# Patient Record
Sex: FEMALE | Race: BLACK OR AFRICAN AMERICAN | NOT HISPANIC OR LATINO | URBAN - METROPOLITAN AREA
[De-identification: names, ages, dates, MRNs, and addresses within clinical notes are randomized per-mention and may not be internally consistent; named-entity substitution may affect disease eponyms.]

---
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
## 2022-04-12 ENCOUNTER — APPOINTMENT (OUTPATIENT)
Dept: OBGYN | Facility: CLINIC | Age: 22
End: 2022-04-12
Payer: MEDICAID

## 2022-04-12 ENCOUNTER — NON-APPOINTMENT (OUTPATIENT)
Age: 22
End: 2022-04-12

## 2022-04-12 PROCEDURE — 99214 OFFICE O/P EST MOD 30 MIN: CPT

## 2022-04-12 PROCEDURE — 76801 OB US < 14 WKS SINGLE FETUS: CPT

## 2022-04-12 PROCEDURE — 87490 CHLMYD TRACH DNA DIR PROBE: CPT

## 2022-04-14 ENCOUNTER — LABORATORY RESULT (OUTPATIENT)
Age: 22
End: 2022-04-14

## 2022-04-22 ENCOUNTER — OUTPATIENT (OUTPATIENT)
Dept: OUTPATIENT SERVICES | Facility: HOSPITAL | Age: 22
LOS: 1 days | Discharge: HOME | End: 2022-04-22

## 2022-04-22 ENCOUNTER — ASOB RESULT (OUTPATIENT)
Age: 22
End: 2022-04-22

## 2022-04-22 ENCOUNTER — APPOINTMENT (OUTPATIENT)
Dept: ANTEPARTUM | Facility: CLINIC | Age: 22
End: 2022-04-22
Payer: MEDICAID

## 2022-04-22 PROCEDURE — 76813 OB US NUCHAL MEAS 1 GEST: CPT | Mod: 26

## 2022-05-05 ENCOUNTER — APPOINTMENT (OUTPATIENT)
Dept: OBGYN | Facility: CLINIC | Age: 22
End: 2022-05-05
Payer: MEDICAID

## 2022-05-05 PROCEDURE — 99213 OFFICE O/P EST LOW 20 MIN: CPT

## 2022-05-20 ENCOUNTER — OUTPATIENT (OUTPATIENT)
Dept: OUTPATIENT SERVICES | Facility: HOSPITAL | Age: 22
LOS: 1 days | Discharge: HOME | End: 2022-05-20

## 2022-06-07 ENCOUNTER — APPOINTMENT (OUTPATIENT)
Dept: ANTEPARTUM | Facility: CLINIC | Age: 22
End: 2022-06-07
Payer: MEDICAID

## 2022-06-07 ENCOUNTER — OUTPATIENT (OUTPATIENT)
Dept: OUTPATIENT SERVICES | Facility: HOSPITAL | Age: 22
LOS: 1 days | Discharge: HOME | End: 2022-06-07

## 2022-06-07 ENCOUNTER — ASOB RESULT (OUTPATIENT)
Age: 22
End: 2022-06-07

## 2022-06-07 PROCEDURE — 76817 TRANSVAGINAL US OBSTETRIC: CPT | Mod: 26

## 2022-06-07 PROCEDURE — 76805 OB US >/= 14 WKS SNGL FETUS: CPT | Mod: 26

## 2022-06-14 ENCOUNTER — APPOINTMENT (OUTPATIENT)
Dept: OBGYN | Facility: CLINIC | Age: 22
End: 2022-06-14

## 2022-06-21 ENCOUNTER — APPOINTMENT (OUTPATIENT)
Dept: OBGYN | Facility: CLINIC | Age: 22
End: 2022-06-21
Payer: MEDICAID

## 2022-06-21 PROCEDURE — 99213 OFFICE O/P EST LOW 20 MIN: CPT

## 2022-06-24 ENCOUNTER — APPOINTMENT (OUTPATIENT)
Dept: ANTEPARTUM | Facility: CLINIC | Age: 22
End: 2022-06-24

## 2022-07-25 ENCOUNTER — APPOINTMENT (OUTPATIENT)
Dept: OBGYN | Facility: CLINIC | Age: 22
End: 2022-07-25

## 2022-07-25 PROCEDURE — 99213 OFFICE O/P EST LOW 20 MIN: CPT

## 2022-08-04 ENCOUNTER — OUTPATIENT (OUTPATIENT)
Dept: OUTPATIENT SERVICES | Facility: HOSPITAL | Age: 22
LOS: 1 days | Discharge: HOME | End: 2022-08-04

## 2022-08-08 ENCOUNTER — OUTPATIENT (OUTPATIENT)
Dept: OUTPATIENT SERVICES | Facility: HOSPITAL | Age: 22
LOS: 1 days | Discharge: HOME | End: 2022-08-08

## 2022-08-17 ENCOUNTER — OUTPATIENT (OUTPATIENT)
Dept: OUTPATIENT SERVICES | Facility: HOSPITAL | Age: 22
LOS: 1 days | Discharge: HOME | End: 2022-08-17

## 2022-08-18 ENCOUNTER — APPOINTMENT (OUTPATIENT)
Dept: OBGYN | Facility: CLINIC | Age: 22
End: 2022-08-18

## 2022-08-18 PROCEDURE — 99213 OFFICE O/P EST LOW 20 MIN: CPT

## 2022-08-19 DIAGNOSIS — K02.63 DENTAL CARIES ON SMOOTH SURFACE PENETRATING INTO PULP: ICD-10-CM

## 2022-08-23 ENCOUNTER — OUTPATIENT (OUTPATIENT)
Dept: OUTPATIENT SERVICES | Facility: HOSPITAL | Age: 22
LOS: 1 days | Discharge: HOME | End: 2022-08-23

## 2022-09-02 ENCOUNTER — OUTPATIENT (OUTPATIENT)
Dept: OUTPATIENT SERVICES | Facility: HOSPITAL | Age: 22
LOS: 1 days | Discharge: HOME | End: 2022-09-02

## 2022-09-08 ENCOUNTER — APPOINTMENT (OUTPATIENT)
Dept: OBGYN | Facility: CLINIC | Age: 22
End: 2022-09-08

## 2022-09-08 VITALS
DIASTOLIC BLOOD PRESSURE: 70 MMHG | SYSTOLIC BLOOD PRESSURE: 120 MMHG | BODY MASS INDEX: 27.31 KG/M2 | WEIGHT: 160 LBS | HEIGHT: 64 IN

## 2022-09-08 PROCEDURE — 99213 OFFICE O/P EST LOW 20 MIN: CPT

## 2022-09-09 ENCOUNTER — OUTPATIENT (OUTPATIENT)
Dept: OUTPATIENT SERVICES | Facility: HOSPITAL | Age: 22
LOS: 1 days | Discharge: HOME | End: 2022-09-09

## 2022-09-09 ENCOUNTER — ASOB RESULT (OUTPATIENT)
Age: 22
End: 2022-09-09

## 2022-09-09 ENCOUNTER — APPOINTMENT (OUTPATIENT)
Dept: ANTEPARTUM | Facility: CLINIC | Age: 22
End: 2022-09-09

## 2022-09-09 PROCEDURE — 76816 OB US FOLLOW-UP PER FETUS: CPT | Mod: 26

## 2022-09-16 ENCOUNTER — OUTPATIENT (OUTPATIENT)
Dept: OUTPATIENT SERVICES | Facility: HOSPITAL | Age: 22
LOS: 1 days | Discharge: HOME | End: 2022-09-16

## 2022-09-22 ENCOUNTER — APPOINTMENT (OUTPATIENT)
Dept: OBGYN | Facility: CLINIC | Age: 22
End: 2022-09-22

## 2022-09-22 LAB
ABO + RH PNL BLD: NORMAL
BASOPHILS # BLD AUTO: 0.02 K/UL
BASOPHILS NFR BLD AUTO: 0.3 %
EOSINOPHIL # BLD AUTO: 0.06 K/UL
EOSINOPHIL NFR BLD AUTO: 0.9 %
GLUCOSE 1H P 50 G GLC PO SERPL-MCNC: 84 MG/DL
HBV SURFACE AG SER QL: NONREACTIVE
HCT VFR BLD CALC: 36.7 %
HCV AB SER QL: NONREACTIVE
HCV S/CO RATIO: 0.13 S/CO
HGB BLD-MCNC: 11.6 G/DL
HIV1+2 AB SPEC QL IA.RAPID: NONREACTIVE
IMM GRANULOCYTES NFR BLD AUTO: 0.3 %
LYMPHOCYTES # BLD AUTO: 1.45 K/UL
LYMPHOCYTES NFR BLD AUTO: 21.6 %
MAN DIFF?: NORMAL
MCHC RBC-ENTMCNC: 28.8 PG
MCHC RBC-ENTMCNC: 31.6 G/DL
MCV RBC AUTO: 91.1 FL
MEV IGG FLD QL IA: 20.1 AU/ML
MEV IGG+IGM SER-IMP: POSITIVE
MONOCYTES # BLD AUTO: 0.44 K/UL
MONOCYTES NFR BLD AUTO: 6.6 %
NEUTROPHILS # BLD AUTO: 4.71 K/UL
NEUTROPHILS NFR BLD AUTO: 70.3 %
PLATELET # BLD AUTO: 224 K/UL
RBC # BLD: 4.03 M/UL
RBC # FLD: 15.4 %
RUBV IGG FLD-ACNC: 1.9 INDEX
RUBV IGG SER-IMP: POSITIVE
T PALLIDUM AB SER QL IA: ABNORMAL
WBC # FLD AUTO: 6.7 K/UL

## 2022-10-03 ENCOUNTER — APPOINTMENT (OUTPATIENT)
Dept: OBGYN | Facility: CLINIC | Age: 22
End: 2022-10-03

## 2022-10-03 VITALS
WEIGHT: 170 LBS | HEIGHT: 64 IN | SYSTOLIC BLOOD PRESSURE: 130 MMHG | DIASTOLIC BLOOD PRESSURE: 75 MMHG | BODY MASS INDEX: 29.02 KG/M2

## 2022-10-03 LAB
BILIRUB UR QL STRIP: NORMAL
CLARITY UR: CLEAR
COLLECTION METHOD: NORMAL
GLUCOSE UR-MCNC: NORMAL
HCG UR QL: 0.2 EU/DL
HGB UR QL STRIP.AUTO: NORMAL
KETONES UR-MCNC: NORMAL
LEUKOCYTE ESTERASE UR QL STRIP: NORMAL
NITRITE UR QL STRIP: NORMAL
PH UR STRIP: 7
PROT UR STRIP-MCNC: NORMAL
SP GR UR STRIP: 1.02

## 2022-10-03 PROCEDURE — 99213 OFFICE O/P EST LOW 20 MIN: CPT

## 2022-10-03 NOTE — HISTORY OF PRESENT ILLNESS
[FreeTextEntry1] : 23 y/o  for prenatal visit\par no complaints\par +fm, no rom, no bleeding, no ctx\par \par Per pt-uncomplicated pregnancy\par all labs nl, sono nl per pt\par \par today -pt doing well\par last sono aga

## 2022-10-05 ENCOUNTER — OUTPATIENT (OUTPATIENT)
Dept: OUTPATIENT SERVICES | Facility: HOSPITAL | Age: 22
LOS: 1 days | Discharge: HOME | End: 2022-10-05

## 2022-10-05 VITALS
SYSTOLIC BLOOD PRESSURE: 131 MMHG | TEMPERATURE: 99 F | RESPIRATION RATE: 14 BRPM | HEART RATE: 88 BPM | DIASTOLIC BLOOD PRESSURE: 78 MMHG

## 2022-10-05 VITALS — SYSTOLIC BLOOD PRESSURE: 131 MMHG | DIASTOLIC BLOOD PRESSURE: 78 MMHG | HEART RATE: 88 BPM

## 2022-10-05 DIAGNOSIS — O47.1 FALSE LABOR AT OR AFTER 37 COMPLETED WEEKS OF GESTATION: ICD-10-CM

## 2022-10-05 DIAGNOSIS — O26.893 OTHER SPECIFIED PREGNANCY RELATED CONDITIONS, THIRD TRIMESTER: ICD-10-CM

## 2022-10-05 LAB
GP B STREP DNA SPEC QL NAA+PROBE: NORMAL
GP B STREP DNA SPEC QL NAA+PROBE: NOT DETECTED
SOURCE GBS: NORMAL

## 2022-10-05 PROCEDURE — 59025 FETAL NON-STRESS TEST: CPT | Mod: 26

## 2022-10-05 PROCEDURE — 99215 OFFICE O/P EST HI 40 MIN: CPT | Mod: 25

## 2022-10-05 PROCEDURE — 76815 OB US LIMITED FETUS(S): CPT | Mod: 26

## 2022-10-05 PROCEDURE — 99417 PROLNG OP E/M EACH 15 MIN: CPT | Mod: 25

## 2022-10-05 NOTE — OB PROVIDER TRIAGE NOTE - NSHPPHYSICALEXAM_GEN_ALL_CORE
Vital Signs Last 24 Hrs  T(C): 37.3 (05 Oct 2022 00:38), Max: 37.3 (05 Oct 2022 00:38)  T(F): 99.1 (05 Oct 2022 00:38), Max: 99.1 (05 Oct 2022 00:38)  HR: 88 (05 Oct 2022 00:38) (88 - 88)  BP: 131/78 (05 Oct 2022 00:38) (131/78 - 131/78)  BP(mean): --  RR: 14 (05 Oct 2022 00:38) (14 - 14)    Gen: NAD, sitting comfortably  Abd: Gravid, soft, NT, palpable ctx  SVE: FT/0/-3, vtx, intact  EFM: 140/mod/+accels/intermitent variable decels - cat 2  [place]: none  Sono: Vital Signs Last 24 Hrs  T(C): 37.3 (05 Oct 2022 00:38), Max: 37.3 (05 Oct 2022 00:38)  T(F): 99.1 (05 Oct 2022 00:38), Max: 99.1 (05 Oct 2022 00:38)  HR: 88 (05 Oct 2022 00:38) (88 - 88)  BP: 131/78 (05 Oct 2022 00:38) (131/78 - 131/78)  BP(mean): --  RR: 14 (05 Oct 2022 00:38) (14 - 14)    Gen: NAD, sitting comfortably  Abd: Gravid, soft, NT, palpable ctx  SVE: FT/0/-3, vtx, intact  EFM: reactive  [place]: none

## 2022-10-05 NOTE — OB RN TRIAGE NOTE - FALL HARM RISK - UNIVERSAL INTERVENTIONS
Bed in lowest position, wheels locked, appropriate side rails in place/Call bell, personal items and telephone in reach/Instruct patient to call for assistance before getting out of bed or chair/Non-slip footwear when patient is out of bed/[place] to call system/Physically safe environment - no spills, clutter or unnecessary equipment/Purposeful Proactive Rounding/Room/bathroom lighting operational, light cord in reach

## 2022-10-05 NOTE — OB PROVIDER TRIAGE NOTE - ATTENDING COMMENTS
37 weeks, not in labor, SROM ruled out  EFM reactive  VS WNL  sono WNL  precautions given  The patient presented to triage and was evaluated starting at 0038.  She was discharged at 0138. I spent 60minutes caring for the patient.  This included obtaining a history, performing a physical examination, continuously monitoring and interpreting the fetal heart rate and tocometer patterns, ordering and reviewing labs, documenting in the medical record, and periodic reassessment and discussion with the patient.

## 2022-10-05 NOTE — OB PROVIDER TRIAGE NOTE - NSHPLABSRESULTS_GEN_ALL_CORE
Labs  4/14  HBsAG NR  Hep C NR  RPR NR  Rubella immune  measles immune  Trep pallidum ab equivical  FTA syphilis conformation NR  O Pos  HIV NR    7/25 GCT 84    Sono  34.0, EFW 2491g (65%), MVP 7.84, posterior placenta, vtx.   20.4, normal anatomy but limited views, MVP 4.8

## 2022-10-05 NOTE — OB PROVIDER TRIAGE NOTE - HISTORY OF PRESENT ILLNESS
1. Take Bactrim twice a day for 7 days with food.  2. Take Diflucan today, repeat in 72 hours of still having symptoms  3. Will call with urine culture results in a few days  4. Follow-up for new or worsening symptoms.   23 yo  @37w5d, NANCY 10/21 by first trimester sonogram, presents for concern she broke her water. Pt reporting urinating at 11pm and the urine was yellow. She then urinated a second time and it was clear, and she became concerned she  her water. She reports good fetal movement, denies any vaginal bleeding or contractions. Denies any pregnancy complications GBS unknown (was swabbed at prenatal visit on 10/3).     Last intercourse - days ago  last meal - 8pm  last bowel movement - unknown

## 2022-10-05 NOTE — OB PROVIDER TRIAGE NOTE - NSOBPROVIDERNOTE_OBGYN_ALL_OB_FT
A/P: 33 yo  @37w5d, GBS unknown, not ruptured, category 2 tracing, resolved, reassuring fetal and maternal status    -membranes not ruptured  -category 2 tracing, resolved to category 1 after pt given juice to drink  -PO hydration encouraged  -labor precautions discussed  -pt instructed to f/u with PMD for scheduled visit  -discharged in stable condition A/P: 31 yo  @37w5d, GBS unknown, not ruptured, category 2 tracing, resolved, BPP 8/8, reassuring fetal and maternal status    -membranes not ruptured  -category 2 tracing, resolved to category 1 after pt given juice to drink  -PO hydration encouraged  -labor precautions discussed  -pt instructed to f/u with PMD for scheduled visit  -discharged in stable condition    Dr. Estevez and Dr. Contreras aware A/P: 33 yo  @37w5d, GBS unknown, not ruptured, BPP 8/8, reassuring fetal and maternal status    -membranes not ruptured  -PO hydration encouraged  -labor precautions discussed  -pt instructed to f/u with PMD for scheduled visit  -discharged in stable condition

## 2022-10-10 PROBLEM — Z78.9 OTHER SPECIFIED HEALTH STATUS: Chronic | Status: ACTIVE | Noted: 2022-10-05

## 2022-10-12 ENCOUNTER — APPOINTMENT (OUTPATIENT)
Dept: OBGYN | Facility: CLINIC | Age: 22
End: 2022-10-12

## 2022-10-12 LAB
A VAGINAE DNA VAG QL NAA+PROBE: NORMAL
BVAB2 DNA VAG QL NAA+PROBE: NORMAL
C KRUSEI DNA VAG QL NAA+PROBE: NEGATIVE
C KRUSEI DNA VAG QL NAA+PROBE: POSITIVE
C TRACH RRNA SPEC QL NAA+PROBE: NEGATIVE
MEGA1 DNA VAG QL NAA+PROBE: NORMAL
N GONORRHOEA RRNA SPEC QL NAA+PROBE: NEGATIVE
T VAGINALIS RRNA SPEC QL NAA+PROBE: NEGATIVE

## 2022-10-16 ENCOUNTER — OUTPATIENT (OUTPATIENT)
Dept: OUTPATIENT SERVICES | Facility: HOSPITAL | Age: 22
LOS: 1 days | Discharge: HOME | End: 2022-10-16

## 2022-10-16 VITALS
RESPIRATION RATE: 17 BRPM | HEART RATE: 72 BPM | TEMPERATURE: 99 F | SYSTOLIC BLOOD PRESSURE: 137 MMHG | DIASTOLIC BLOOD PRESSURE: 85 MMHG

## 2022-10-16 VITALS — HEART RATE: 81 BPM | DIASTOLIC BLOOD PRESSURE: 75 MMHG | SYSTOLIC BLOOD PRESSURE: 123 MMHG

## 2022-10-16 NOTE — OB PROVIDER TRIAGE NOTE - HISTORY OF PRESENT ILLNESS
21 yo  @39w2d, NANCY 10/21 by first trimester sonogram, presents after "passing her mucus plug". Reports mild irregular contractions, 3/10, every 5-10 minutes. Denies LOF, VB. Good FM. Denies complications this pregnancy. GBS negative.

## 2022-10-16 NOTE — OB PROVIDER TRIAGE NOTE - NSHPPHYSICALEXAM_GEN_ALL_CORE
T(C): 37.1 (10-16-22 @ 21:26), Max: 37.1 (10-16-22 @ 21:26)  HR: 76 (10-16-22 @ 21:56) (72 - 76)  BP: 136/75 (10-16-22 @ 21:56) (136/75 - 137/85)  RR: 17 (10-16-22 @ 21:26) (17 - 17)    Gen: A+OX3. NAD  Abd: Soft, Nontender. Gravid. Mild palpable contractions  SVE: 2/50/-2, vtx, intact    FHR: 145BPM/mod aria/accels pos   TOCO: irregular      EFW by [place]:

## 2022-10-16 NOTE — OB PROVIDER TRIAGE NOTE - NSOBPROVIDERNOTE_OBGYN_ALL_OB_FT
A/P: 31 yo  @39w2d, GBS neg, likely in prodromal labor,  -discussed with patient that given negative GBS status and nulliparity that she likely has much time before active labor occurs. Offered admission for pain management vs discharge to home to allow patient to labor down in comfort of home. Patient opted for discharge, not currently desiring intervention for pain control.   -discussed with patient she should return if contractions become more intense requiring intervention for pain control, are every 3-5min, she thinks her water is broken, experiences heavy vaginal bleeding, or does not feel the baby move  -discharge to home     Dr. Estevez and Dr. Barfield aware

## 2022-10-17 ENCOUNTER — INPATIENT (INPATIENT)
Facility: HOSPITAL | Age: 22
LOS: 1 days | Discharge: HOME | End: 2022-10-19
Attending: OBSTETRICS & GYNECOLOGY | Admitting: OBSTETRICS & GYNECOLOGY
Payer: MEDICAID

## 2022-10-17 ENCOUNTER — OUTPATIENT (OUTPATIENT)
Dept: OUTPATIENT SERVICES | Facility: HOSPITAL | Age: 22
LOS: 1 days | Discharge: HOME | End: 2022-10-17

## 2022-10-17 ENCOUNTER — APPOINTMENT (OUTPATIENT)
Dept: OBGYN | Facility: CLINIC | Age: 22
End: 2022-10-17

## 2022-10-17 VITALS
HEART RATE: 91 BPM | DIASTOLIC BLOOD PRESSURE: 68 MMHG | SYSTOLIC BLOOD PRESSURE: 129 MMHG | TEMPERATURE: 99 F | RESPIRATION RATE: 18 BRPM

## 2022-10-17 VITALS — DIASTOLIC BLOOD PRESSURE: 76 MMHG | SYSTOLIC BLOOD PRESSURE: 138 MMHG | WEIGHT: 175 LBS

## 2022-10-17 DIAGNOSIS — Z34.90 ENCOUNTER FOR SUPERVISION OF NORMAL PREGNANCY, UNSPECIFIED, UNSPECIFIED TRIMESTER: ICD-10-CM

## 2022-10-17 LAB
AMPHET UR-MCNC: NEGATIVE — SIGNIFICANT CHANGE UP
APPEARANCE UR: ABNORMAL
BACTERIA # UR AUTO: ABNORMAL
BARBITURATES UR SCN-MCNC: NEGATIVE — SIGNIFICANT CHANGE UP
BASOPHILS # BLD AUTO: 0.01 K/UL — SIGNIFICANT CHANGE UP (ref 0–0.2)
BASOPHILS NFR BLD AUTO: 0.1 % — SIGNIFICANT CHANGE UP (ref 0–1)
BENZODIAZ UR-MCNC: NEGATIVE — SIGNIFICANT CHANGE UP
BILIRUB UR-MCNC: NEGATIVE — SIGNIFICANT CHANGE UP
BLD GP AB SCN SERPL QL: SIGNIFICANT CHANGE UP
BUPRENORPHINE SCREEN, URINE RESULT: NEGATIVE — SIGNIFICANT CHANGE UP
COCAINE METAB.OTHER UR-MCNC: NEGATIVE — SIGNIFICANT CHANGE UP
COLOR SPEC: YELLOW — SIGNIFICANT CHANGE UP
DIFF PNL FLD: ABNORMAL
EOSINOPHIL # BLD AUTO: 0.02 K/UL — SIGNIFICANT CHANGE UP (ref 0–0.7)
EOSINOPHIL NFR BLD AUTO: 0.2 % — SIGNIFICANT CHANGE UP (ref 0–8)
EPI CELLS # UR: 7 /HPF — HIGH (ref 0–5)
FENTANYL UR QL: NEGATIVE — SIGNIFICANT CHANGE UP
GLUCOSE UR QL: SIGNIFICANT CHANGE UP
HCT VFR BLD CALC: 35.2 % — LOW (ref 37–47)
HGB BLD-MCNC: 11.9 G/DL — LOW (ref 12–16)
HIV 1 & 2 AB SERPL IA.RAPID: SIGNIFICANT CHANGE UP
HYALINE CASTS # UR AUTO: 12 /LPF — HIGH (ref 0–7)
IMM GRANULOCYTES NFR BLD AUTO: 0.3 % — SIGNIFICANT CHANGE UP (ref 0.1–0.3)
KETONES UR-MCNC: ABNORMAL
L&D DRUG SCREEN, URINE: SIGNIFICANT CHANGE UP
LEUKOCYTE ESTERASE UR-ACNC: ABNORMAL
LYMPHOCYTES # BLD AUTO: 1.16 K/UL — LOW (ref 1.2–3.4)
LYMPHOCYTES # BLD AUTO: 11.8 % — LOW (ref 20.5–51.1)
MCHC RBC-ENTMCNC: 29.6 PG — SIGNIFICANT CHANGE UP (ref 27–31)
MCHC RBC-ENTMCNC: 33.8 G/DL — SIGNIFICANT CHANGE UP (ref 32–37)
MCV RBC AUTO: 87.6 FL — SIGNIFICANT CHANGE UP (ref 81–99)
METHADONE UR-MCNC: NEGATIVE — SIGNIFICANT CHANGE UP
MONOCYTES # BLD AUTO: 0.69 K/UL — HIGH (ref 0.1–0.6)
MONOCYTES NFR BLD AUTO: 7 % — SIGNIFICANT CHANGE UP (ref 1.7–9.3)
NEUTROPHILS # BLD AUTO: 7.9 K/UL — HIGH (ref 1.4–6.5)
NEUTROPHILS NFR BLD AUTO: 80.6 % — HIGH (ref 42.2–75.2)
NITRITE UR-MCNC: NEGATIVE — SIGNIFICANT CHANGE UP
NRBC # BLD: 0 /100 WBCS — SIGNIFICANT CHANGE UP (ref 0–0)
OPIATES UR-MCNC: NEGATIVE — SIGNIFICANT CHANGE UP
OXYCODONE UR-MCNC: NEGATIVE — SIGNIFICANT CHANGE UP
PCP UR-MCNC: NEGATIVE — SIGNIFICANT CHANGE UP
PH UR: 6.5 — SIGNIFICANT CHANGE UP (ref 5–8)
PLATELET # BLD AUTO: 208 K/UL — SIGNIFICANT CHANGE UP (ref 130–400)
PRENATAL SYPHILIS TEST: SIGNIFICANT CHANGE UP
PROPOXYPHENE QUALITATIVE URINE RESULT: NEGATIVE — SIGNIFICANT CHANGE UP
PROT UR-MCNC: ABNORMAL
RBC # BLD: 4.02 M/UL — LOW (ref 4.2–5.4)
RBC # FLD: 14.2 % — SIGNIFICANT CHANGE UP (ref 11.5–14.5)
RBC CASTS # UR COMP ASSIST: 15 /HPF — HIGH (ref 0–4)
SARS-COV-2 RNA SPEC QL NAA+PROBE: SIGNIFICANT CHANGE UP
SP GR SPEC: 1.02 — SIGNIFICANT CHANGE UP (ref 1.01–1.03)
UROBILINOGEN FLD QL: SIGNIFICANT CHANGE UP
WBC # BLD: 9.81 K/UL — SIGNIFICANT CHANGE UP (ref 4.8–10.8)
WBC # FLD AUTO: 9.81 K/UL — SIGNIFICANT CHANGE UP (ref 4.8–10.8)
WBC UR QL: 36 /HPF — HIGH (ref 0–5)

## 2022-10-17 PROCEDURE — 99213 OFFICE O/P EST LOW 20 MIN: CPT

## 2022-10-17 PROCEDURE — 59409 OBSTETRICAL CARE: CPT | Mod: U9

## 2022-10-17 RX ORDER — INFLUENZA VIRUS VACCINE 15; 15; 15; 15 UG/.5ML; UG/.5ML; UG/.5ML; UG/.5ML
0.5 SUSPENSION INTRAMUSCULAR ONCE
Refills: 0 | Status: DISCONTINUED | OUTPATIENT
Start: 2022-10-17 | End: 2022-10-17

## 2022-10-17 RX ORDER — OXYTOCIN 10 UNIT/ML
333.33 VIAL (ML) INJECTION
Qty: 20 | Refills: 0 | Status: DISCONTINUED | OUTPATIENT
Start: 2022-10-17 | End: 2022-10-17

## 2022-10-17 RX ORDER — OXYTOCIN 10 UNIT/ML
333.33 VIAL (ML) INJECTION
Qty: 20 | Refills: 0 | Status: DISCONTINUED | OUTPATIENT
Start: 2022-10-17 | End: 2022-10-19

## 2022-10-17 RX ORDER — TETANUS TOXOID, REDUCED DIPHTHERIA TOXOID AND ACELLULAR PERTUSSIS VACCINE, ADSORBED 5; 2.5; 8; 8; 2.5 [IU]/.5ML; [IU]/.5ML; UG/.5ML; UG/.5ML; UG/.5ML
0.5 SUSPENSION INTRAMUSCULAR ONCE
Refills: 0 | Status: DISCONTINUED | OUTPATIENT
Start: 2022-10-17 | End: 2022-10-19

## 2022-10-17 RX ORDER — OXYCODONE HYDROCHLORIDE 5 MG/1
5 TABLET ORAL ONCE
Refills: 0 | Status: DISCONTINUED | OUTPATIENT
Start: 2022-10-17 | End: 2022-10-19

## 2022-10-17 RX ORDER — HYDROCORTISONE 1 %
1 OINTMENT (GRAM) TOPICAL EVERY 6 HOURS
Refills: 0 | Status: DISCONTINUED | OUTPATIENT
Start: 2022-10-17 | End: 2022-10-19

## 2022-10-17 RX ORDER — LANOLIN
1 OINTMENT (GRAM) TOPICAL EVERY 6 HOURS
Refills: 0 | Status: DISCONTINUED | OUTPATIENT
Start: 2022-10-17 | End: 2022-10-19

## 2022-10-17 RX ORDER — PRAMOXINE HYDROCHLORIDE 150 MG/15G
1 AEROSOL, FOAM RECTAL EVERY 4 HOURS
Refills: 0 | Status: DISCONTINUED | OUTPATIENT
Start: 2022-10-17 | End: 2022-10-19

## 2022-10-17 RX ORDER — SIMETHICONE 80 MG/1
80 TABLET, CHEWABLE ORAL EVERY 4 HOURS
Refills: 0 | Status: DISCONTINUED | OUTPATIENT
Start: 2022-10-17 | End: 2022-10-19

## 2022-10-17 RX ORDER — DIPHENHYDRAMINE HCL 50 MG
25 CAPSULE ORAL EVERY 6 HOURS
Refills: 0 | Status: DISCONTINUED | OUTPATIENT
Start: 2022-10-17 | End: 2022-10-19

## 2022-10-17 RX ORDER — ACETAMINOPHEN 500 MG
975 TABLET ORAL
Refills: 0 | Status: DISCONTINUED | OUTPATIENT
Start: 2022-10-17 | End: 2022-10-19

## 2022-10-17 RX ORDER — MAGNESIUM HYDROXIDE 400 MG/1
30 TABLET, CHEWABLE ORAL
Refills: 0 | Status: DISCONTINUED | OUTPATIENT
Start: 2022-10-17 | End: 2022-10-19

## 2022-10-17 RX ORDER — SODIUM CHLORIDE 9 MG/ML
1000 INJECTION, SOLUTION INTRAVENOUS
Refills: 0 | Status: DISCONTINUED | OUTPATIENT
Start: 2022-10-17 | End: 2022-10-17

## 2022-10-17 RX ORDER — BENZOCAINE 10 %
1 GEL (GRAM) MUCOUS MEMBRANE EVERY 6 HOURS
Refills: 0 | Status: DISCONTINUED | OUTPATIENT
Start: 2022-10-17 | End: 2022-10-19

## 2022-10-17 RX ORDER — IBUPROFEN 200 MG
600 TABLET ORAL EVERY 6 HOURS
Refills: 0 | Status: DISCONTINUED | OUTPATIENT
Start: 2022-10-17 | End: 2022-10-19

## 2022-10-17 RX ORDER — SODIUM CHLORIDE 9 MG/ML
3 INJECTION INTRAMUSCULAR; INTRAVENOUS; SUBCUTANEOUS EVERY 8 HOURS
Refills: 0 | Status: DISCONTINUED | OUTPATIENT
Start: 2022-10-17 | End: 2022-10-19

## 2022-10-17 RX ORDER — DIBUCAINE 1 %
1 OINTMENT (GRAM) RECTAL EVERY 6 HOURS
Refills: 0 | Status: DISCONTINUED | OUTPATIENT
Start: 2022-10-17 | End: 2022-10-19

## 2022-10-17 RX ORDER — KETOROLAC TROMETHAMINE 30 MG/ML
30 SYRINGE (ML) INJECTION ONCE
Refills: 0 | Status: DISCONTINUED | OUTPATIENT
Start: 2022-10-17 | End: 2022-10-17

## 2022-10-17 RX ORDER — IBUPROFEN 200 MG
600 TABLET ORAL EVERY 6 HOURS
Refills: 0 | Status: COMPLETED | OUTPATIENT
Start: 2022-10-17 | End: 2023-09-15

## 2022-10-17 RX ORDER — AER TRAVELER 0.5 G/1
1 SOLUTION RECTAL; TOPICAL EVERY 4 HOURS
Refills: 0 | Status: DISCONTINUED | OUTPATIENT
Start: 2022-10-17 | End: 2022-10-19

## 2022-10-17 RX ORDER — OXYCODONE HYDROCHLORIDE 5 MG/1
5 TABLET ORAL
Refills: 0 | Status: DISCONTINUED | OUTPATIENT
Start: 2022-10-17 | End: 2022-10-19

## 2022-10-17 RX ADMIN — Medication 1000 MILLIUNIT(S)/MIN: at 20:30

## 2022-10-17 RX ADMIN — Medication 30 MILLIGRAM(S): at 21:00

## 2022-10-17 RX ADMIN — Medication 30 MILLIGRAM(S): at 20:29

## 2022-10-17 NOTE — OB PROVIDER H&P - ASSESSMENT
22yr old  at 39.3 wks RH POS, GBS Neg in labor    Admit to L&D  IV Hydration and labs  Continuous TOCO and EFM  Medications as ordered  Pain management prn      Dr Herman Contreras aware  Dr Tammy Tomas aware

## 2022-10-17 NOTE — OB PROVIDER H&P - NSHPPHYSICALEXAM_GEN_ALL_CORE
Vital Signs (24 Hrs):  T(C): 37.2 (10-17-22 @ 15:35), Max: 37.2 (10-17-22 @ 15:35)  HR: 91 (10-17-22 @ 15:41) (72 - 91)  BP: 129/68 (10-17-22 @ 15:41) (123/75 - 137/85)  RR: 18 (10-17-22 @ 15:35) (17 - 18)      Gen: nad  Abd: gravid, soft NT  VE: 5/100/0, vtx,  bulging  FHR: 150/mod/+accels  TOCO: every 5-6 min

## 2022-10-17 NOTE — HISTORY OF PRESENT ILLNESS
[FreeTextEntry1] : 23 y/o female walked i nc/o ctx q5\par sees Dr Orourke, no bleeidng, +fm\par \par bp nl\par + fhr\par ve: 5/100/0\par \par dx: labor\par sent to hospital\par called ob attending

## 2022-10-17 NOTE — CHART NOTE - NSCHARTNOTEFT_GEN_A_CORE
PGY2 Note    Patient seen and examined at bedside for exam. EFM category I. [place] q5 min. SVE: 6/80/-2, AROM clear.     Patient comfortable, does not desire pain management.     Dr Contreras and Dr Tellez aware

## 2022-10-17 NOTE — OB RN DELIVERY SUMMARY - NS_GBSABX_OBGYN_ALL_OB
9/13/2021        Carmencita Martinez  [address]  [facility] [address]            Dear Carmencita,    Your test results from your last visit have returned.    High lipids, follow a low-fat diet and repeat fasting lipids in 6 months at the lab. Rest within normal range.    Enclosed is the low fat diet information that we spoke about on the telephone.    Low-Fat Diet     A low-fat diet will help you lose weight. It also can lower cholesterol and prevent symptoms of gallbladder disease. The average American diet contains up to 50% fat. This means that half of all calories come from fat (about 80 grams to 100 grams of fat per day). Choosing normal portions of foods from the list below can help lower your fat intake. Experts recommend that only 20% to 35% of your daily calories come from fat. The remaining 65% to 80% of calories will come from protein and carbohydrates.  Breads  OK: Whole-wheat or rye bread, marcia or soda crackers, hira toast, plain rolls, bagels, English muffins  Don't have: Rolls and breads containing whole milk or egg; waffles, pancakes, biscuits, corn bread; cheese crackers, other flavored crackers, pastries, doughnuts  Cereals  OK: Oatmeal, whole-wheat, bran, multigrain, rice  Don't have:Granola or other cereals that have oil, coconut, or more than 2 grams of fat per serving  Cheese and eggs  OK: Cheeses labeled low-fat; 3 whole eggs per week; egg whites and egg substitutes as desired  Don't have: All other cheeses  Desserts  OK: Gelatin, slushy, michelle food cake, meringues, nonfat yogurt, and puddings or sherbet made with nonfat milk  Don't have: Any other store-bought desserts, or desserts that have fat, whole milk, cream, chocolate, and coconut  Drinks  OK: Nonfat milk, coffee, tea, fizzy (carbonated) drinks  Don't have:Whole and reduced-fat milk, evaporated and condensed milk, hot chocolate mixes, milk shakes, malts, eggnog  Fats  OK: You may have up to 3 teaspoons of fat daily. This  can be butter, margarine, mayonnaise, or healthy oils (canola or olive)  Don't have: Cream, nondairy creams, cream cheese, gravies, and cream sauces  Fruits  OK: All fruits made without fat  Don't have: Coconut, olives  Meats, poultry, fish  OK: Limit meat to 6 ounces daily (broiled, roasted, baked, grilled, or boiled). Buy lean cuts, and trim off the fat. Try beef, fish, lamb, pork, and canned fish packed in water; also chicken and turkey with the skin removed.  Don't have: Fried meats, fish, or poultry; fried eggs, and fish canned in oils; fatty meats such as richardson, sausage, corned beef, hot dogs, and lunch meats; meats with gravies and sauces  Potatoes, beans, pasta  OK: Dried beans, split peas, lentils, potatoes, rice, pasta made without added fat  Don't have: French fries, potato chips, potatoes prepared with butter, refried beans  Soups  OK: Clear broth soups without fat and with allowed vegetables  Don't have: Cream-based soups  Vegetables  OK: Fresh, frozen, canned or dried vegetables, all made without added fat  Don't have: Fried vegetables and those prepared with butter, cream, sauces  Other foods  OK: Salt, sugar, jelly, hard candy, marshmallows, honey, syrup, spices and herbs, mustard, ketchup, lemon, and vinegar. Try to limit sweets and added sugars.  Don't have: Chocolate, nuts, coconut, and cream candies; sunflower, sesame, and other seeds; fried foods; cream sauces and gravies; pizza  [employer] last reviewed this educational content on 10/1/2019  © 9290-1387 [employer]. All rights reserved. This information is not intended as a substitute for professional medical care. Always follow your healthcare professional's instructions.      Low-Fat Cooking Tips  To eat less fat, you may need to learn some new ways to cook. But that doesn't mean you have to eat bland, boring food. And it doesn't mean cooking needs to take any more time. Here are some tips for cooking and seasoning foods with less  fat.      Broil fish instead of frying it. And sprinkle on herbs to add flavor.    Try new cooking methods  · Broil, roast, bake, steam, or microwave fish, chicken, turkey, and other meats.  · Remove skin from chicken and turkey and trim extra fat from meat before cooking.  · Sprinkle herbs on meat, chicken, and fish, and in soups.  · Cook in broth instead of fat.  · Use nonstick cooking sprays or nonstick pans.  · Steam or microwave vegetables without adding fat. Serve with herbs, lemon juice, vinegar, or fat-free butter-flavored powder.  · To flavor beans and rice, add chopped onions, garlic, and peppers.  · Chill soups and stews. Before reheating and serving, skim off the fat.  · When you add fat, use canola or olive oil instead of butter or lard.    Lighten up your recipes  · In soups and sauces: Replace whole milk or cream with low-fat milk, evaporated fat-free milk, or nonfat dry milk.  · In puddings and other desserts: Replace whole milk or cream with low-fat milk or fat-free condensed milk.  · To make dips and toppings: Use low-fat or nonfat cottage cheese or sour cream.  · To make salad dressings: Use nonfat yogurt or low-fat buttermilk.  · In place of 1 whole egg in recipes: Use 2 egg whites or 1/4 cup egg substitute.  · In place of regular cheese: Use fat-free or reduced-fat cheese.    [employer] last reviewed this educational content on 5/1/2020  © 8630-6634 [employer] [place]. [address] [place] [address]. All rights reserved. This information is not intended as a substitute for professional medical care. Always follow your healthcare professional's instructions.           If you have any further questions, please feel free to call.  I have included your results for your records.    Resulted Orders   LIPID PANEL WITH REFLEX   Result Value Ref Range    Fasting Status      Cholesterol 232 (H) <=199 mg/dL    Triglycerides 192 (H) <=149 mg/dL    HDL 54 >=50 mg/dL     (H) <=129 mg/dL     Non-HDL Cholesterol 178 mg/dL    Cholesterol/ HDL Ratio 4.3 <=4.4   COMPREHENSIVE METABOLIC PANEL   Result Value Ref Range    Fasting Status      Sodium 138 135 - 145 mmol/L    Potassium 4.3 3.4 - 5.1 mmol/L    Chloride 101 98 - 107 mmol/L    Carbon Dioxide 29 21 - 32 mmol/L    Anion Gap 12 10 - 20 mmol/L    Glucose 99 65 - 99 mg/dL    BUN 8 6 - 20 mg/dL    Creatinine 0.73 0.51 - 0.95 mg/dL    Glomerular Filtration Rate >90 >=60    BUN/ Creatinine Ratio 11 7 - 25    Calcium 9.3 8.4 - 10.2 mg/dL    Bilirubin, Total 0.5 0.2 - 1.0 mg/dL    GOT/AST 28 <=37 Units/L    GPT/ALT 39 <64 Units/L    Alkaline Phosphatase 96 45 - 117 Units/L    Albumin 3.9 3.6 - 5.1 g/dL    Protein, Total 7.6 6.4 - 8.2 g/dL    Globulin 3.7 2.0 - 4.0 g/dL    A/G Ratio 1.1 1.0 - 2.4   URINALYSIS WITH MICROSCOPY & CULTURE IF INDICATED   Result Value Ref Range    COLOR, URINALYSIS Straw     APPEARANCE, URINALYSIS Clear     GLUCOSE, URINALYSIS Negative Negative mg/dL    BILIRUBIN, URINALYSIS Negative Negative    KETONES, URINALYSIS Negative Negative mg/dL    SPECIFIC GRAVITY, URINALYSIS <1.005 (L) 1.005 - 1.030    OCCULT BLOOD, URINALYSIS Negative Negative    PH, URINALYSIS 6.0 5.0 - 7.0    PROTEIN, URINALYSIS Negative Negative mg/dL    UROBILINOGEN, URINALYSIS 0.2 0.2, 1.0 mg/dL    NITRITE, URINALYSIS Negative Negative    LEUKOCYTE ESTERASE, URINALYSIS Negative Negative    SQUAMOUS EPITHELIAL, URINALYSIS 1 to 5 None Seen, 1 to 5 /hpf    ERYTHROCYTES, URINALYSIS 1 to 2 None Seen, 1 to 2 /hpf    LEUKOCYTES, URINALYSIS 1 to 5 None Seen, 1 to 5 /hpf    BACTERIA, URINALYSIS Few (A) None Seen /hpf    HYALINE CASTS, URINALYSIS None Seen None Seen, 1 to 5 /lpf    MUCUS Present    CBC WITH AUTOMATED DIFFERENTIAL (PERFORMABLE ONLY)   Result Value Ref Range    WBC 6.8 4.2 - 11.0 K/mcL    RBC 4.35 4.00 - 5.20 mil/mcL    HGB 13.5 12.0 - 15.5 g/dL    HCT 42.7 36.0 - 46.5 %    MCV 98.2 78.0 - 100.0 fl    MCH 31.0 26.0 - 34.0 pg    MCHC 31.6 (L) 32.0 - 36.5 g/dL     RDW-CV 12.1 11.0 - 15.0 %    RDW-SD 44.1 39.0 - 50.0 fL     140 - 450 K/mcL    NRBC 0 <=0 /100 WBC    Neutrophil, Percent 60 %    Lymphocytes, Percent 27 %    Mono, Percent 10 %    Eosinophils, Percent 3 %    Basophils, Percent 0 %    Immature Granulocytes 0 %    Absolute Neutrophils 4.0 1.8 - 7.7 K/mcL    Absolute Lymphocytes 1.8 1.0 - 4.0 K/mcL    Absolute Monocytes 0.7 0.3 - 0.9 K/mcL    Absolute Eosinophils  0.2 0.0 - 0.5 K/mcL    Absolute Basophils 0.0 0.0 - 0.3 K/mcL    Absolute Immmature Granulocytes 0.0 0.0 - 0.2 K/mcL    Narrative    This is an appended report.  These results have been appended to a previously verified report.       Sincerely,      Dr. Radha Capone  [address] [address] [place] [address]  [phone]   N/A

## 2022-10-17 NOTE — OB PROVIDER DELIVERY SUMMARY - NSDELIVERYTYPEA_OBGYN_ALL_OB
Vaginal Delivery
Implemented All Universal Safety Interventions:  [place] to call system. Call bell, personal items and telephone within reach. Instruct patient to call for assistance. Room bathroom lighting operational. Non-slip footwear when patient is off stretcher. Physically safe environment: no spills, clutter or unnecessary equipment. Stretcher in lowest position, wheels locked, appropriate side rails in place.

## 2022-10-17 NOTE — OB PROVIDER H&P - HISTORY OF PRESENT ILLNESS
22 yr old  at 39w3d sent in s/p PN visit with ctx, mild, every 5-6 min VE 5/100/0. Pt was seen in triage last night, was found to be 2 cm. Pt reports (+) FM, denies VB, lof.  Pt had Equivocal RPR with neg confirmatory and NR FTA

## 2022-10-17 NOTE — OB RN PATIENT PROFILE - FUNCTIONAL ASSESSMENT - BASIC MOBILITY 6.
4-calculated by average/Not able to assess (calculate score using [facility] averaging method)

## 2022-10-17 NOTE — OB RN PATIENT PROFILE - NS TRANSFER PATIENT BELONGINGS
normal... Well appearing, well nourished, awake, alert, oriented to person, place, time/situation and in no apparent distress. Clothing

## 2022-10-17 NOTE — OB RN DELIVERY SUMMARY - NSSELHIDDEN_OBGYN_ALL_OB_FT
[NS_DeliveryAttending1_OBGYN_ALL_OB_FT:[id number]] [NS_DeliveryAttending1_OBGYN_ALL_OB_FT:[id number]],[NS_DeliveryAssist1_OBGYN_ALL_OB_FT:[email or web address]=],[NS_DeliveryRN_OBGYN_ALL_OB_FT:[id number]=]

## 2022-10-17 NOTE — OB RN TRIAGE NOTE - CHIEF COMPLAINT QUOTE
ctx  5-10 min "Dr. Orourke sent me in because i'm dilated 5cm" ctx  5-10 min "Dr. eParson sent me in because i'm dilated 5cm"

## 2022-10-17 NOTE — PROGRESS NOTE ADULT - SUBJECTIVE AND OBJECTIVE BOX
PGY3 Note    Patient seen at bedside. No complaints at the moment.    T(F): 98.6 (10-17 @ 17:26), Max: 99 (10-17 @ 15:35)  HR: 82 (10-17 @ 17:26)  BP: 120/65 (10-17 @ 17:26) (120/65 - 137/85)  RR: 18 (10-17 @ 17:26)  EFM: 140bpm/moderate variability/+accelerations/no decelerations  TOCO: q4mins  SVE: 7.5/90/-1 vtx ruptured    Medications:  none      Labs:                        11.9   9.81  )-----------( 208      ( 17 Oct 2022 17:06 )             35.2           Prenatal Syphilis Test: Nonreact (10-17 @ 17:06)  Rapid HIV-1/2 Antibody: Nonreact (10-17 @ 17:06)  Antibody Screen: NEG (10-17-22 @ 17:06)    Urinalysis Basic - ( 17 Oct 2022 17:45 )    Color: Yellow / Appearance: Slightly Turbid / S.021 / pH: x  Gluc: x / Ketone: Small  / Bili: Negative / Urobili: <2 mg/dL   Blood: x / Protein: 30 mg/dL / Nitrite: Negative   Leuk Esterase: Large / RBC: 15 /HPF / WBC 36 /HPF   Sq Epi: x / Non Sq Epi: 7 /HPF / Bacteria: Moderate

## 2022-10-17 NOTE — OB PROVIDER H&P - LABOR: FETAL STATION
Airway    Date/Time: 2/27/2022 12:50 PM  Performed by: Peña Fierro MD  Authorized by: Peña Fierro MD     Final Airway Type:  Endotracheal airway  Final Endotracheal Airway*:  ETT  ETT Size (mm)*:  7.5  Cuff*:  Regular  Technique Used for Successful ETT Placement:  Direct laryngoscopy  Devices/Methods Used in Placement*:  Mask  Intubation Procedure*:  Preoxygenation, ETCO2, Atraumatic, Dentition Unchanged, Pharynx Clear and Cricoid Pressure  Insertion Site:  Oral  Blade Type*:  MAC  Blade Size*:  4  Cuff Volume (mL):  6  Measured from*:  Lips  Secured at (cm)*:  23  Placement Verified by: auscultation and capnometry    Glottic View*:  1 - full view of glottis  Attempts*:  1  Number of Other Approaches Attempted:  0   Patient Identified, Procedure confirmed, Emergency equipment available and Safety protocols followed  Location:  OR  Urgency:  Elective  Difficult Airway: No    Indications for Airway Management:  Anesthesia  Sedation Level:  Anesthetized  Mask Difficulty Assessment:  1 - vent by mask  Performed By:  Anesthesiologist  Anesthesiologist:  Peña Fierro MD  Start Time: 2/27/2022 12:49 PM         -1, 0, 1

## 2022-10-17 NOTE — OB RN PATIENT PROFILE - TEMPERATURE IN CELSIUS (DEGREES C)
37 Complex Repair And Flap Additional Text (Will Appearing After The Standard Complex Repair Text): The complex repair was not sufficient to completely close the primary defect. The remaining additional defect was repaired with the flap mentioned below.

## 2022-10-17 NOTE — PROGRESS NOTE ADULT - ASSESSMENT
A/P:   22y  at 39w3d GA, GBS neg, in active labor progressing well.  -Continue current management   -Pain management prn  -Continuous EFM/toco  -IVF hydration, clear liquid diet  -F/u UDS  -Reevaluate     Dr. Contreras to be made aware

## 2022-10-17 NOTE — OB PROVIDER DELIVERY SUMMARY - NSSELHIDDEN_OBGYN_ALL_OB_FT
[NS_DeliveryAttending1_OBGYN_ALL_OB_FT:[id number]],[NS_DeliveryAssist1_OBGYN_ALL_OB_FT:[email or web address]],[NS_DeliveryRN_OBGYN_ALL_OB_FT:[id number]],[NS_DeliveryAssist2_OBGYN_ALL_OB_FT:[id number]=]

## 2022-10-18 ENCOUNTER — TRANSCRIPTION ENCOUNTER (OUTPATIENT)
Age: 22
End: 2022-10-18

## 2022-10-18 DIAGNOSIS — Z02.9 ENCOUNTER FOR ADMINISTRATIVE EXAMINATIONS, UNSPECIFIED: ICD-10-CM

## 2022-10-18 LAB
BASOPHILS # BLD AUTO: 0.02 K/UL — SIGNIFICANT CHANGE UP (ref 0–0.2)
BASOPHILS NFR BLD AUTO: 0.2 % — SIGNIFICANT CHANGE UP (ref 0–1)
EOSINOPHIL # BLD AUTO: 0.06 K/UL — SIGNIFICANT CHANGE UP (ref 0–0.7)
EOSINOPHIL NFR BLD AUTO: 0.5 % — SIGNIFICANT CHANGE UP (ref 0–8)
HCT VFR BLD CALC: 27.3 % — LOW (ref 37–47)
HGB BLD-MCNC: 9.3 G/DL — LOW (ref 12–16)
IMM GRANULOCYTES NFR BLD AUTO: 0.3 % — SIGNIFICANT CHANGE UP (ref 0.1–0.3)
LYMPHOCYTES # BLD AUTO: 1.72 K/UL — SIGNIFICANT CHANGE UP (ref 1.2–3.4)
LYMPHOCYTES # BLD AUTO: 14.4 % — LOW (ref 20.5–51.1)
MCHC RBC-ENTMCNC: 29.7 PG — SIGNIFICANT CHANGE UP (ref 27–31)
MCHC RBC-ENTMCNC: 34.1 G/DL — SIGNIFICANT CHANGE UP (ref 32–37)
MCV RBC AUTO: 87.2 FL — SIGNIFICANT CHANGE UP (ref 81–99)
MONOCYTES # BLD AUTO: 1.31 K/UL — HIGH (ref 0.1–0.6)
MONOCYTES NFR BLD AUTO: 11 % — HIGH (ref 1.7–9.3)
NEUTROPHILS # BLD AUTO: 8.81 K/UL — HIGH (ref 1.4–6.5)
NEUTROPHILS NFR BLD AUTO: 73.6 % — SIGNIFICANT CHANGE UP (ref 42.2–75.2)
NRBC # BLD: 0 /100 WBCS — SIGNIFICANT CHANGE UP (ref 0–0)
PLATELET # BLD AUTO: 185 K/UL — SIGNIFICANT CHANGE UP (ref 130–400)
RBC # BLD: 3.13 M/UL — LOW (ref 4.2–5.4)
RBC # FLD: 14.4 % — SIGNIFICANT CHANGE UP (ref 11.5–14.5)
WBC # BLD: 11.96 K/UL — HIGH (ref 4.8–10.8)
WBC # FLD AUTO: 11.96 K/UL — HIGH (ref 4.8–10.8)

## 2022-10-18 PROCEDURE — 99231 SBSQ HOSP IP/OBS SF/LOW 25: CPT

## 2022-10-18 RX ORDER — ACETAMINOPHEN 500 MG
3 TABLET ORAL
Qty: 0 | Refills: 0 | DISCHARGE
Start: 2022-10-18

## 2022-10-18 RX ORDER — SIMETHICONE 80 MG/1
1 TABLET, CHEWABLE ORAL
Qty: 0 | Refills: 0 | DISCHARGE
Start: 2022-10-18

## 2022-10-18 RX ORDER — IBUPROFEN 200 MG
1 TABLET ORAL
Qty: 0 | Refills: 0 | DISCHARGE
Start: 2022-10-18

## 2022-10-18 RX ADMIN — Medication 600 MILLIGRAM(S): at 11:35

## 2022-10-18 RX ADMIN — Medication 600 MILLIGRAM(S): at 12:05

## 2022-10-18 RX ADMIN — Medication 1 TABLET(S): at 11:35

## 2022-10-18 RX ADMIN — SODIUM CHLORIDE 3 MILLILITER(S): 9 INJECTION INTRAMUSCULAR; INTRAVENOUS; SUBCUTANEOUS at 05:36

## 2022-10-18 RX ADMIN — Medication 975 MILLIGRAM(S): at 21:00

## 2022-10-18 RX ADMIN — SODIUM CHLORIDE 3 MILLILITER(S): 9 INJECTION INTRAMUSCULAR; INTRAVENOUS; SUBCUTANEOUS at 15:15

## 2022-10-18 RX ADMIN — SODIUM CHLORIDE 3 MILLILITER(S): 9 INJECTION INTRAMUSCULAR; INTRAVENOUS; SUBCUTANEOUS at 22:00

## 2022-10-18 RX ADMIN — Medication 975 MILLIGRAM(S): at 21:30

## 2022-10-18 NOTE — PROGRESS NOTE ADULT - ASSESSMENT
21yo now P1 S/P  PPD1 , recovering well    - encourage ambulation  - PO hydration  - Continue diet as tolerated   - Monitor vitals and bleeding  - f/u AM CBC       Dr. Tomas and OB attending to be made aware 23yo now P1 S/P  PPD1 , recovering well    - encourage ambulation  - PO hydration  - Continue diet as tolerated   - Monitor vitals and bleeding  - f/u AM CBC       Dr. Tomas and OB attending to be made aware.

## 2022-10-18 NOTE — DISCHARGE NOTE OB - CARE PROVIDER_API CALL
Wesley Orourke)  Obstetrics and Gynecology  [address] [place] [address]  Phone: [phone]  Fax: [phone]  Follow Up Time:

## 2022-10-18 NOTE — PROGRESS NOTE ADULT - SUBJECTIVE AND OBJECTIVE BOX
PGY1 Note    Patient seen and examined. Pain well controlled at this time. No complaints at this time. Denies fever, chills, nausea, vomiting, chest pain, shortness of breath, severe abdominal pain, heavy vaginal bleeding.     Patient is ambulating.   Tolerating regular diet   Voiding without difficulty, no dysuria       MEDICATIONS  (STANDING):  acetaminophen     Tablet .. 975 milliGRAM(s) Oral <User Schedule>  diphtheria/tetanus/pertussis (acellular) Vaccine (ADAcel) 0.5 milliLiter(s) IntraMuscular once  ibuprofen  Tablet. 600 milliGRAM(s) Oral every 6 hours  oxytocin Infusion 333.333 milliUNIT(s)/Min (1000 mL/Hr) IV Continuous <Continuous>  prenatal multivitamin 1 Tablet(s) Oral daily  sodium chloride 0.9% lock flush 3 milliLiter(s) IV Push every 8 hours        Physical Exam  Vital Signs Last 24 Hrs  T(C): 36.9 (18 Oct 2022 07:44), Max: 37.2 (17 Oct 2022 15:35)  T(F): 98.5 (18 Oct 2022 07:44), Max: 99 (17 Oct 2022 15:35)  HR: 89 (18 Oct 2022 07:44) (80 - 95)  BP: 117/59 (18 Oct 2022 07:44) (117/59 - 133/76)  BP(mean): --  RR: 18 (18 Oct 2022 07:44) (18 - 18)  SpO2: --    Gen: AAOx3, NAD  Cardio: RRR, S1S2, no M/R/G  Resp: CTAB  Ext: No calf tenderness, no swelling  Fundus: firm, below umbilicus. Nontender.   Abd: Soft, nontender, nondistended, BS+  Lochia: minimal moderate      Labs:                        9.3    11.96 )-----------( 185      ( 18 Oct 2022 07:44 )             27.3                         11.9   9.81  )-----------( 208      ( 17 Oct 2022 17:06 )             35.2

## 2022-10-18 NOTE — DISCHARGE NOTE OB - NS MD DC FALL RISK RISK
For information on Fall & Injury Prevention, visit: [email or web address] OR  [email or web address] OR  https://www.cdc.gov/steadi/patient.html

## 2022-10-18 NOTE — PROGRESS NOTE ADULT - SUBJECTIVE AND OBJECTIVE BOX
Patient is PPD1, no complaints, minimal vaginal bleeding.  abdomen soft, fundus firm  Vital Signs Last 24 Hrs  T(C): 36.8 (17 Oct 2022 22:57), Max: 37.2 (17 Oct 2022 15:35)  T(F): 98.3 (17 Oct 2022 22:57), Max: 99 (17 Oct 2022 15:35)  HR: 82 (17 Oct 2022 22:57) (80 - 95)  BP: 121/73 (17 Oct 2022 22:57) (120/65 - 133/76)  RR: 18 (17 Oct 2022 22:57) (18 - 18)

## 2022-10-18 NOTE — DISCHARGE NOTE OB - PATIENT PORTAL LINK FT
You can access the FollowMyHealth Patient Portal offered by [facility] by registering at the following website: [email or web address]. By joining [email or web address]’s FollowMyHealth portal, you will also be able to view your health information using other applications (apps) compatible with our system.

## 2022-10-19 VITALS
SYSTOLIC BLOOD PRESSURE: 126 MMHG | RESPIRATION RATE: 18 BRPM | HEART RATE: 83 BPM | DIASTOLIC BLOOD PRESSURE: 66 MMHG | TEMPERATURE: 97 F

## 2022-10-19 PROCEDURE — 99238 HOSP IP/OBS DSCHRG MGMT 30/<: CPT

## 2022-10-19 RX ORDER — INFLUENZA VIRUS VACCINE 15; 15; 15; 15 UG/.5ML; UG/.5ML; UG/.5ML; UG/.5ML
0.5 SUSPENSION INTRAMUSCULAR ONCE
Refills: 0 | Status: COMPLETED | OUTPATIENT
Start: 2022-10-19 | End: 2022-10-19

## 2022-10-19 RX ADMIN — Medication 600 MILLIGRAM(S): at 00:11

## 2022-10-19 RX ADMIN — Medication 600 MILLIGRAM(S): at 00:12

## 2022-10-19 RX ADMIN — Medication 975 MILLIGRAM(S): at 03:22

## 2022-10-19 RX ADMIN — Medication 1 TABLET(S): at 12:16

## 2022-10-19 RX ADMIN — INFLUENZA VIRUS VACCINE 0.5 MILLILITER(S): 15; 15; 15; 15 SUSPENSION INTRAMUSCULAR at 18:52

## 2022-10-19 RX ADMIN — Medication 600 MILLIGRAM(S): at 12:15

## 2022-10-19 RX ADMIN — Medication 975 MILLIGRAM(S): at 03:19

## 2022-10-19 RX ADMIN — SODIUM CHLORIDE 3 MILLILITER(S): 9 INJECTION INTRAMUSCULAR; INTRAVENOUS; SUBCUTANEOUS at 06:04

## 2022-10-19 RX ADMIN — Medication 975 MILLIGRAM(S): at 15:22

## 2022-10-19 RX ADMIN — Medication 975 MILLIGRAM(S): at 08:30

## 2022-10-19 NOTE — PROGRESS NOTE ADULT - SUBJECTIVE AND OBJECTIVE BOX
PGY1 Note    Patient seen and examined. Pain well controlled at this time. No complaints at this time. Denies fever, chills, nausea, vomiting, chest pain, shortness of breath, severe abdominal pain, heavy vaginal bleeding.     Patient is ambulating.    Tolerating regular diet   Voiding: Voiding without difficulty, no dysuria       MEDICATIONS  (STANDING):  acetaminophen     Tablet .. 975 milliGRAM(s) Oral <User Schedule>  diphtheria/tetanus/pertussis (acellular) Vaccine (ADAcel) 0.5 milliLiter(s) IntraMuscular once  ibuprofen  Tablet. 600 milliGRAM(s) Oral every 6 hours  oxytocin Infusion 333.333 milliUNIT(s)/Min (1000 mL/Hr) IV Continuous <Continuous>  prenatal multivitamin 1 Tablet(s) Oral daily  sodium chloride 0.9% lock flush 3 milliLiter(s) IV Push every 8 hours      Physical Exam  Vital Signs Last 24 Hrs  T(C): 36.4 (18 Oct 2022 23:34), Max: 36.9 (18 Oct 2022 07:44)  T(F): 97.6 (18 Oct 2022 23:34), Max: 98.5 (18 Oct 2022 07:44)  HR: 101 (18 Oct 2022 23:34) (89 - 104)  BP: 117/57 (18 Oct 2022 23:34) (117/57 - 130/58)  RR: 18 (18 Oct 2022 23:34) (18 - 18)    Gen: AAOx3, NAD  Cardio: RRR, S1S2, no M/R/G  Resp: CTAB  Ext: No calf tenderness, no swelling  Fundus: firm, below umbilicus. Nontender.   Abd: Soft, nontender, nondistended, BS+  Lochia: minimal moderate    Labs:                        9.3    11.96 )-----------( 185      ( 18 Oct 2022 07:44 )             27.3                         11.9   9.81  )-----------( 208      ( 17 Oct 2022 17:06 )             35.2

## 2022-10-19 NOTE — PROGRESS NOTE ADULT - ASSESSMENT
21yo now P1 S/P  PPD2 , recovering well    - encourage ambulation  - PO hydration  - Continue diet as tolerated   - Monitor vitals and bleeding  - Anticipate d/c home today    Dr. Tomas and OB attending to be made aware.

## 2022-10-21 DIAGNOSIS — Z28.09 IMMUNIZATION NOT CARRIED OUT BECAUSE OF OTHER CONTRAINDICATION: ICD-10-CM

## 2022-10-21 DIAGNOSIS — Z3A.39 39 WEEKS GESTATION OF PREGNANCY: ICD-10-CM

## 2022-10-21 DIAGNOSIS — Z23 ENCOUNTER FOR IMMUNIZATION: ICD-10-CM

## 2022-10-21 DIAGNOSIS — Z28.21 IMMUNIZATION NOT CARRIED OUT BECAUSE OF PATIENT REFUSAL: ICD-10-CM

## 2022-11-28 ENCOUNTER — APPOINTMENT (OUTPATIENT)
Dept: OBGYN | Facility: CLINIC | Age: 22
End: 2022-11-28

## 2023-02-14 NOTE — OB RN TRIAGE NOTE - NSDCRESPRATE_OBGYN_A_OB_NU
16 Epidermal Autograft Text: The defect edges were debeveled with a #15 scalpel blade.  Given the location of the defect, shape of the defect and the proximity to free margins an epidermal autograft was deemed most appropriate.  Using a sterile surgical marker, the primary defect shape was transferred to the donor site. The epidermal graft was then harvested.  The skin graft was then placed in the primary defect and oriented appropriately.

## 2023-09-29 NOTE — OB PROVIDER DELIVERY SUMMARY - NSPROVIDERDELIVERYNOTE_OBGYN_ALL_OB_FT
Patient requiring PICC line insertion for pressors/access.    Chart reviewed and patient assess for any contraindications for PICC line placement.    Ultrasound used to evaluate vessel for PICC line placement. Per protocol, using maximum barrier precautions and sterile technique, a Power PICC Catheter was inserted with good initial blood return noted. Please refer to [employer] documentation for details. Ultrasound guidance used to locate, evaluate, and visualize needle cannulation into the vein. PICC catheter advanced to pre measured length. Used 3CG technology to compare expected spikes in internal P-waves to verify catheter tip in the SVC. Catheter ports flushed with normal saline, with brisk blood return present. Sterile caps applied. Limb precaution band applied. Patient tolerated procedure well and no complications noted. Instruction sign placed above the bed. Standing orders initiated. Primary nurse notified. Follow PICC maintenance orders.   Patient was fully dilated and pushing. Head delivered in OA and restituted to LOT. Anterior shoulder delivered followed by posterior shoulder atraumatically, then rest of the body. The baby was suctioned and placed on mother's abdomen. Delayed cord clamping performed. Cord clamped and cut. Cord gases obtained. Placenta was spontaneously delivered intact. Fundus was massaged and firm. Good hemostasis was noted. Cervix and vagina were inspected. First degree perineal laceration was noted and repaired in the usual fashion with 2-0 chromic, with good hemostasis. Viable male delivered APGARS 9/9 weighing 3260 Patient was fully dilated and pushing. Head delivered in OA and restituted to LOT. Anterior shoulder delivered followed by posterior shoulder atraumatically, then rest of the body. The baby was suctioned and placed on mother's abdomen. Delayed cord clamping performed. Cord clamped and cut. Cord gases obtained. Placenta was spontaneously delivered intact. Fundus was massaged and firm. Good hemostasis was noted. Cervix and vagina were inspected. First degree perineal laceration was noted and repaired in the usual fashion with 2-0 chromic, with good hemostasis. Viable male delivered APGARS 9/9 weighing 3260    ATTENDING NOTE:  I was present for the delivery  uncomplicated, atrauamtic   agree w/ above

## 2023-12-12 ENCOUNTER — APPOINTMENT (OUTPATIENT)
Dept: PLASTIC SURGERY | Facility: CLINIC | Age: 23
End: 2023-12-12
Payer: MEDICAID

## 2023-12-12 VITALS — WEIGHT: 160 LBS | BODY MASS INDEX: 27.31 KG/M2 | HEIGHT: 64 IN

## 2023-12-12 DIAGNOSIS — F31.70 BIPOLAR DISORDER, CURRENTLY IN REMISSION, MOST RECENT EPISODE UNSPECIFIED: ICD-10-CM

## 2023-12-12 DIAGNOSIS — Z78.9 OTHER SPECIFIED HEALTH STATUS: ICD-10-CM

## 2023-12-12 PROCEDURE — 99203 OFFICE O/P NEW LOW 30 MIN: CPT

## 2024-02-01 ENCOUNTER — APPOINTMENT (OUTPATIENT)
Dept: OBGYN | Facility: CLINIC | Age: 24
End: 2024-02-01
Payer: MEDICAID

## 2024-02-01 ENCOUNTER — LABORATORY RESULT (OUTPATIENT)
Age: 24
End: 2024-02-01

## 2024-02-01 PROCEDURE — 76801 OB US < 14 WKS SINGLE FETUS: CPT

## 2024-02-01 PROCEDURE — 99214 OFFICE O/P EST MOD 30 MIN: CPT

## 2024-02-01 NOTE — HISTORY OF PRESENT ILLNESS
[FreeTextEntry1] : ---- 24 Y/O  LMP 23 EDC 24 EGA 6 WEEKS PMHX;/ PSHX;/ SOCIAL HX;-ETOH -CIGG  STD;   /        STD PREVENTION DISCUSSED FAMILY HISTORY OF BREAST CANCER;NO REVIEW OF SYMPTOMS DONE; ALLERGIES; pt answered NKDA Medication reconciliation was completed by reviewing, with the patient's involvement, the patient's current outpatient medications and those  ordered for the patient today.   X 1 6 POUNDS NO COMPLICATIONS.          PE; ABDOMEN;SOFT NT ND NL GENITALIA VAGINA -DC CERVIX;-CMT UTERUS; TOP NL SIZE NT AV ADNEXA; NT - MASSES   A;P;EARLY PREGNANCY -PAP GC CHLAMYDIA -LABS -MVI -SONO -RTC 2 WEEKS

## 2024-02-04 ENCOUNTER — NON-APPOINTMENT (OUTPATIENT)
Age: 24
End: 2024-02-04

## 2024-02-05 ENCOUNTER — NON-APPOINTMENT (OUTPATIENT)
Age: 24
End: 2024-02-05

## 2024-02-05 LAB — HPV HIGH+LOW RISK DNA PNL CVX: NOT DETECTED

## 2024-02-08 ENCOUNTER — NON-APPOINTMENT (OUTPATIENT)
Age: 24
End: 2024-02-08

## 2024-02-14 LAB — CYTOLOGY CVX/VAG DOC THIN PREP: NORMAL

## 2024-02-15 ENCOUNTER — APPOINTMENT (OUTPATIENT)
Dept: OBGYN | Facility: CLINIC | Age: 24
End: 2024-02-15
Payer: MEDICAID

## 2024-02-15 DIAGNOSIS — N91.2 AMENORRHEA, UNSPECIFIED: ICD-10-CM

## 2024-02-15 PROCEDURE — 99213 OFFICE O/P EST LOW 20 MIN: CPT

## 2024-02-15 PROCEDURE — 76817 TRANSVAGINAL US OBSTETRIC: CPT

## 2024-02-15 NOTE — HISTORY OF PRESENT ILLNESS
[FreeTextEntry1] : ---- 24 Y/O  LMP 23 EDC 24 EGA 8 WEEKS;PT HERE FOR F-U;SONO REVIEWED. PMHX;/ PSHX;/ SOCIAL HX;-ETOH -CIGG  STD;   /        STD PREVENTION DISCUSSED FAMILY HISTORY OF BREAST CANCER;NO REVIEW OF SYMPTOMS DONE; ALLERGIES; pt answered NKDA Medication reconciliation was completed by reviewing, with the patient's involvement, the patient's current outpatient medications and those  ordered for the patient today.   X 1 6 POUNDS NO COMPLICATIONS.          PE; ABDOMEN;SOFT NT ND EXT -CCE  A;P;EARLY PREGNANCY -SONO REVIEWED -RTC 4 WEEKS FOR VISIT AND NIPTS -ANSWERED QUESTIONS

## 2024-02-23 ENCOUNTER — APPOINTMENT (OUTPATIENT)
Dept: PLASTIC SURGERY | Facility: CLINIC | Age: 24
End: 2024-02-23
Payer: MEDICAID

## 2024-02-23 DIAGNOSIS — D48.5 NEOPLASM OF UNCERTAIN BEHAVIOR OF SKIN: ICD-10-CM

## 2024-02-23 PROCEDURE — 11443 EXC FACE-MM B9+MARG 2.1-3 CM: CPT

## 2024-02-23 PROCEDURE — 13152 CMPLX RPR E/N/E/L 2.6-7.5 CM: CPT

## 2024-02-28 NOTE — PROCEDURE
[FreeTextEntry6] : Patient is a 23 year old female with a left preauricular keloid measuring approximately 2.3 x 1.5 cm.    The area was prepped and draped in the usual fashion.  Local anesthetic was administered using 1% lidocaine with epinephrine.  Keloid was sharply excised.  Area was irrigated copiously.  Complex wound closure was performed in layers.  The wound measured approximately 3 cm.  Sterile dressing applied.    Patient tolerated procedure well and understands post-op instructions.  Sutures Used: 4-0 monocryl, 5-0 prolene

## 2024-02-29 LAB — CORE LAB BIOPSY: NORMAL

## 2024-03-01 ENCOUNTER — APPOINTMENT (OUTPATIENT)
Dept: PLASTIC SURGERY | Facility: CLINIC | Age: 24
End: 2024-03-01
Payer: MEDICAID

## 2024-03-01 DIAGNOSIS — L91.0 HYPERTROPHIC SCAR: ICD-10-CM

## 2024-03-01 PROCEDURE — 99024 POSTOP FOLLOW-UP VISIT: CPT

## 2024-03-01 NOTE — DATA REVIEWED
[FreeTextEntry1] : Patient:     MORE LONGORIA  Accession:                             [id number]  Collected Date/Time:                   2/23/2024 12:55 EST Received Date/Time:                    2/26/2024 07:50 EST  Surgical Pathology Report - Auth (Verified)  Specimen(s) Submitted Left preauricular keloid  Final Diagnosis  Left preauricular keloid, excision:   -  Dermal scar with keloidal type collagen (please see the comment)  Verified by: Hosea Beasley MD (Electronic Signature) Reported on: 02/29/24 15:01 EST, [facility], [facility], [address] [place] [address] Histology technical [place] [address] Phone: [phone]   Fax: [phone] _________________________________________________________________   Comment The histological features are consistent with keloid or hypertrophic scar. Cliniopathologic correlation is advised.  Clinical Information Left preauricular keloid  Perioperative Diagnosis D48.5-Neoplasm of uncertain behavior of skin  Gross Description Received in formalin labeled "left preauricular keloid".  The specimen  consists of 1 tan keloid measuring 3 x 1.9 x 1.4 cm.  The specimen is inked, serially sectioned and representative sections submitted.  (1 block)  02/26/2024 12:25:49 EST   LB  Disclaimer In addition to other data that may appear on the specimen containers, all labels have been inspected to confirm the presence of the patients name and date of birth.  Specimen was received and underwent gross examination at [facility], [address] [place] [address].  Ordered by: DEO GAMINO IV       Collected/Examined: [id number] 12:55PM       Verification Required       Stage: Final       Performed at: [facility] (Med Director: Blas Jane)       Resulted: 29Feb2024 03:01PM       Last Updated: 29Feb2024 03:01PM       Accession: [id number]       Results Hx:	 There are no additional results to show Details:	 To Be Done:	23Feb2024 Status:	Resulted: Requires Verification For:	Neoplasm of uncertain behavior of skin (D48.5) Overdue:	[id number] 12:54PM To Be Performed:	[facility] Communicated By:	Requested transmission to performing location Priority:	Routine Ordered By:	DEO GAMINO IV Supervised By:	DEO GAMINO IV Managed By:	DEO GAMINO IV Authorization:	Not Required Performing Instructions:	 Patient Instructions:	 Order Instructions:	 Questions:	 Date/Time Collected A: 23Feb2024 Number of Sites (Enter each site description below.) A: 1 Site of Specimen A: Left preauricular keloid Add'l Details:	 Financial Auth:	Not Needed Authorization #:	 Appt. Status:	Appointment Not Needed Effective:	23Feb2024 12:00AM Expires:	23Feb2025 12:00AM Done:	23Feb2024 12:55PM Order #:	[id number] Requisition #:	[id number] Label Type:	 Collection:	Collection Specimen Identifier:	 ID:	 CPT:	 LOINC:	 SNOMED:	 Type:	 Charges:	None Will Be Collected in Office?	No View link item history	 Goals:	None Charging:	          (none) Encounters:	 Creation:	23Feb2024 Appointment DEO GAMINO IV (Plastic Surgery)  Collection:	None Specified Be Done By:	None Specified Scheduled:	None Specified Performed:	None Specified Charge :	None Specified Annotations:	          (none) Electronically Signed By: DEO GAMINO IV, MD 23-Feb-2024 12:55 PM

## 2024-03-01 NOTE — HISTORY OF PRESENT ILLNESS
[FreeTextEntry1] : 22 yo otherwise healthy F with PMHx of Bipolar disorder (stable, no meds) who presents today for evaluation of left cheek keloid for several months after body piercing. Pt had a bar piercing in this location for approximately 1 year and noticed development of thick scar tissue. She has used topical silicone gel with no improvement. Denies any steroid injections.   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 Pt is doing well. Denies f/c/drainage. Has been back at work since surgery. Here for suture removal with mom and son.

## 2024-03-01 NOTE — PHYSICAL EXAM
[de-identified] : well-developed female, NAD [de-identified] : Left preauricular vertical incision is CDI, skin edges well approximated with no open areas. No cellulitis or drainage. Sutures in place [de-identified] : NC/AT [de-identified] : nonlabored breathing

## 2024-03-01 NOTE — ASSESSMENT
[FreeTextEntry1] : 22 yo F with left preauricular keloid indicated for excision.   discussed otpions: excision+postop RT or excision 9+ steroid   Pt is 1 week s/p excision of left pre-auricular keloid scar with kenalog injection. Doing very well   - D/c sutures - All bandages changed, steri strips placed - Scar management: Once steris fall off, start Aquaphor then switch to silicone based scar cream / strips - signs and symptoms of infection reviewed - Light activity ok, nothing strenuous for another 2 weeks - ok to shower - Path reviewed: Keloid scar - All post op instructions reviewed, all questions answered - f/u 6 weeks for 2nd kenalog injection if warranted

## 2024-03-12 DIAGNOSIS — Z3A.12 12 WEEKS GESTATION OF PREGNANCY: ICD-10-CM

## 2024-03-14 ENCOUNTER — APPOINTMENT (OUTPATIENT)
Dept: OBGYN | Facility: CLINIC | Age: 24
End: 2024-03-14

## 2024-03-18 ENCOUNTER — NON-APPOINTMENT (OUTPATIENT)
Age: 24
End: 2024-03-18

## 2024-04-18 ENCOUNTER — APPOINTMENT (OUTPATIENT)
Dept: PLASTIC SURGERY | Facility: CLINIC | Age: 24
End: 2024-04-18

## 2024-05-08 ENCOUNTER — NON-APPOINTMENT (OUTPATIENT)
Age: 24
End: 2024-05-08

## 2024-12-05 ENCOUNTER — APPOINTMENT (OUTPATIENT)
Dept: PLASTIC SURGERY | Facility: CLINIC | Age: 24
End: 2024-12-05

## 2025-01-21 ENCOUNTER — APPOINTMENT (OUTPATIENT)
Dept: PLASTIC SURGERY | Facility: CLINIC | Age: 25
End: 2025-01-21

## 2025-02-25 ENCOUNTER — APPOINTMENT (OUTPATIENT)
Dept: PLASTIC SURGERY | Facility: CLINIC | Age: 25
End: 2025-02-25

## 2025-03-13 ENCOUNTER — APPOINTMENT (OUTPATIENT)
Dept: OBGYN | Facility: CLINIC | Age: 25
End: 2025-03-13

## 2025-04-29 ENCOUNTER — APPOINTMENT (OUTPATIENT)
Dept: PLASTIC SURGERY | Facility: CLINIC | Age: 25
End: 2025-04-29
Payer: MEDICAID

## 2025-04-29 PROCEDURE — G2211 COMPLEX E/M VISIT ADD ON: CPT | Mod: NC

## 2025-04-29 PROCEDURE — 99212 OFFICE O/P EST SF 10 MIN: CPT

## 2025-05-14 NOTE — OB RN DELIVERY SUMMARY - BABY A: APGAR 5 MIN MUSCLE TONE, DELIVERY
started.  Have a goal, but break it into easy tasks. Small steps build into big accomplishments.  Staying motivated.  If you feel like skipping your activity, remember your goal. Maybe you want to move better and stay independent. Every activity gets you one step closer.  Not feeling your best.  Start with 5 minutes of an activity you enjoy. Prove to yourself you can do it. As you get comfortable, increase your time.  You may not be where you want to be. But you're in the process of getting there. Everyone starts somewhere.  How can you find safe ways to stay active?  Talk with your doctor about any physical challenges you're facing. Make a plan with your doctor if you have a health problem or aren't sure how to get started with activity.  If you're already active, ask your doctor if there is anything you should change to stay safe as your body and health change.  If you tend to feel dizzy after you take medicine, avoid activity at that time. Try being active before you take your medicine. This will reduce your risk of falls.  If you plan to be active at home, make sure to clear your space before you get started. Remove things like TV cords, coffee tables, and throw rugs. It's safest to have plenty of space to move freely.  The key to getting more active is to take it slow and steady. Try to improve only a little bit at a time. Pick just one area to improve on at first. And if an activity hurts, stop and talk to your doctor.  Where can you learn more?  Go to https://www.[email or web address].net/patientEd and enter P600 to learn more about \"Learning About Being Active as an Older Adult.\"  Current as of: July 31, 2024  Content Version: 14.4  © 5049-8252 [employer].   Care instructions adapted under license by [employer]. If you have questions about a medical condition or this instruction, always ask your healthcare professional. [employer] disclaims any warranty or liability for your use of this  (2) well flexed

## 2025-06-09 ENCOUNTER — APPOINTMENT (OUTPATIENT)
Dept: OBGYN | Facility: CLINIC | Age: 25
End: 2025-06-09

## 2025-07-09 ENCOUNTER — APPOINTMENT (OUTPATIENT)
Age: 25
End: 2025-07-09

## 2025-08-13 ENCOUNTER — NON-APPOINTMENT (OUTPATIENT)
Age: 25
End: 2025-08-13

## 2025-08-13 ENCOUNTER — ASOB RESULT (OUTPATIENT)
Age: 25
End: 2025-08-13

## 2025-08-13 ENCOUNTER — APPOINTMENT (OUTPATIENT)
Dept: ANTEPARTUM | Facility: CLINIC | Age: 25
End: 2025-08-13
Payer: MEDICAID

## 2025-08-13 VITALS
WEIGHT: 156 LBS | HEART RATE: 85 BPM | DIASTOLIC BLOOD PRESSURE: 71 MMHG | HEIGHT: 64 IN | BODY MASS INDEX: 26.63 KG/M2 | SYSTOLIC BLOOD PRESSURE: 116 MMHG | OXYGEN SATURATION: 100 %

## 2025-08-13 PROCEDURE — 76813 OB US NUCHAL MEAS 1 GEST: CPT

## 2025-08-13 RX ORDER — PV W-O VIT A/FERROUS FUM/FOLIC 40 MG-1 MG
TABLET ORAL
Refills: 0 | Status: ACTIVE | COMMUNITY

## 2025-08-29 ENCOUNTER — TRANSCRIPTION ENCOUNTER (OUTPATIENT)
Age: 25
End: 2025-08-29